# Patient Record
Sex: MALE | Race: WHITE | NOT HISPANIC OR LATINO | Employment: FULL TIME | URBAN - METROPOLITAN AREA
[De-identification: names, ages, dates, MRNs, and addresses within clinical notes are randomized per-mention and may not be internally consistent; named-entity substitution may affect disease eponyms.]

---

## 2023-03-06 DIAGNOSIS — E29.1 HYPOGONADISM MALE: Primary | ICD-10-CM

## 2023-03-06 PROBLEM — M79.673 FOOT PAIN: Status: ACTIVE | Noted: 2023-03-06

## 2023-03-06 PROBLEM — R21 RASH: Status: ACTIVE | Noted: 2023-03-06

## 2023-03-06 PROBLEM — Z86.19 H/O COLD SORES: Status: ACTIVE | Noted: 2023-03-06

## 2023-03-06 PROBLEM — G57.91: Status: ACTIVE | Noted: 2023-03-06

## 2023-03-06 PROBLEM — A64 STD (MALE): Status: ACTIVE | Noted: 2023-03-06

## 2023-03-06 PROBLEM — R79.89 ELEVATED SERUM CREATININE: Status: ACTIVE | Noted: 2023-03-06

## 2023-03-06 PROBLEM — M21.40 PES PLANUS: Status: ACTIVE | Noted: 2023-03-06

## 2023-03-06 PROBLEM — M25.511 SHOULDER PAIN, BILATERAL: Status: ACTIVE | Noted: 2023-03-06

## 2023-03-06 PROBLEM — R93.89 ABNORMAL CHEST X-RAY: Status: ACTIVE | Noted: 2023-03-06

## 2023-03-06 PROBLEM — M76.71 PERONEAL TENDINITIS OF RIGHT LOWER LEG: Status: ACTIVE | Noted: 2023-03-06

## 2023-03-06 PROBLEM — M77.41 METATARSALGIA OF RIGHT FOOT: Status: ACTIVE | Noted: 2023-03-06

## 2023-03-06 PROBLEM — M76.829 POSTERIOR TIBIAL TENDON DYSFUNCTION: Status: ACTIVE | Noted: 2023-03-06

## 2023-03-06 PROBLEM — M25.512 SHOULDER PAIN, BILATERAL: Status: ACTIVE | Noted: 2023-03-06

## 2023-03-06 PROBLEM — M81.0 OSTEOPOROSIS: Status: ACTIVE | Noted: 2023-03-06

## 2023-03-06 PROBLEM — M25.579 ANKLE PAIN: Status: ACTIVE | Noted: 2023-03-06

## 2023-03-06 PROBLEM — K21.9 GERD WITHOUT ESOPHAGITIS: Status: ACTIVE | Noted: 2023-03-06

## 2023-03-06 PROBLEM — J06.9 VIRAL URI: Status: ACTIVE | Noted: 2023-03-06

## 2023-03-06 PROBLEM — M76.61 ACHILLES TENDINITIS OF RIGHT LOWER EXTREMITY: Status: ACTIVE | Noted: 2023-03-06

## 2023-03-06 PROBLEM — S93.409A SPRAIN OF ANKLE: Status: ACTIVE | Noted: 2023-03-06

## 2023-03-06 RX ORDER — TESTOSTERONE CYPIONATE 200 MG/ML
200 INJECTION, SOLUTION INTRAMUSCULAR
COMMUNITY
Start: 2012-12-22 | End: 2023-03-30 | Stop reason: SDUPTHER

## 2023-03-06 RX ORDER — CALCIUM PHOS/VIT D3/MAG OXIDE 600 MG-500
1 TABLET ORAL DAILY
COMMUNITY

## 2023-03-06 RX ORDER — CHOLECALCIFEROL (VITAMIN D3) 25 MCG
1 TABLET ORAL DAILY
COMMUNITY
Start: 2013-08-23

## 2023-03-06 RX ORDER — GLUCOSAMINE/CHONDROITIN/C/MANG 500-400 MG
1 CAPSULE ORAL 3 TIMES DAILY
COMMUNITY
Start: 2018-12-26

## 2023-03-06 RX ORDER — SYRINGE W-NEEDLE,DISPOSAB,3 ML 25GX5/8"
SYRINGE, EMPTY DISPOSABLE MISCELLANEOUS
COMMUNITY
Start: 2014-03-27

## 2023-03-06 RX ORDER — FAMOTIDINE 20 MG/1
1 TABLET, FILM COATED ORAL 2 TIMES DAILY PRN
COMMUNITY
Start: 2016-10-07

## 2023-03-06 RX ORDER — TESTOSTERONE CYPIONATE 200 MG/ML
200 INJECTION, SOLUTION INTRAMUSCULAR
Qty: 6 ML | Refills: 0 | Status: CANCELLED | OUTPATIENT
Start: 2023-03-06 | End: 2023-06-04

## 2023-03-09 NOTE — TELEPHONE ENCOUNTER
Spoke with Pt he is aware to have his labs drawn and I confirmed how he would like to receive his link to sign up for [facility], Email was verified per Pt's request and link was sent via email.

## 2023-03-09 NOTE — TELEPHONE ENCOUNTER
Pt called for a refill of a medication, request sent to [facility], per [facility] Pt is to have his labs checked and to sign up for MyChart. A voicemail has been left for Pt to give our office a callback so I can inform him of what his next steps are. ST

## 2023-03-17 ENCOUNTER — LAB (OUTPATIENT)
Dept: LAB | Facility: LAB | Age: 58
End: 2023-03-17
Payer: COMMERCIAL

## 2023-03-17 DIAGNOSIS — E29.1 HYPOGONADISM MALE: ICD-10-CM

## 2023-03-17 LAB — PROSTATE SPECIFIC AG (NG/ML) IN SER/PLAS: 0.76 NG/ML (ref 0–4)

## 2023-03-17 PROCEDURE — 84153 ASSAY OF PSA TOTAL: CPT

## 2023-03-17 PROCEDURE — 36415 COLL VENOUS BLD VENIPUNCTURE: CPT

## 2023-03-17 PROCEDURE — 84403 ASSAY OF TOTAL TESTOSTERONE: CPT

## 2023-03-18 LAB — TESTOSTERONE (NG/DL) IN SER/PLAS: 506 NG/DL (ref 240–1000)

## 2023-03-30 DIAGNOSIS — E29.1 HYPOGONADISM MALE: Primary | ICD-10-CM

## 2023-03-30 RX ORDER — TESTOSTERONE CYPIONATE 200 MG/ML
200 INJECTION, SOLUTION INTRAMUSCULAR
Qty: 10 ML | Refills: 0 | Status: SHIPPED | OUTPATIENT
Start: 2023-03-30 | End: 2023-09-07 | Stop reason: SDUPTHER

## 2023-05-08 ENCOUNTER — OFFICE VISIT (OUTPATIENT)
Dept: PRIMARY CARE | Facility: CLINIC | Age: 58
End: 2023-05-08
Payer: COMMERCIAL

## 2023-05-08 VITALS — WEIGHT: 145.38 LBS | HEIGHT: 67 IN | BODY MASS INDEX: 22.82 KG/M2

## 2023-05-08 DIAGNOSIS — M77.9 TENDINITIS: Primary | ICD-10-CM

## 2023-05-08 PROCEDURE — 99214 OFFICE O/P EST MOD 30 MIN: CPT | Performed by: INTERNAL MEDICINE

## 2023-05-08 RX ORDER — OMEPRAZOLE 20 MG/1
20 CAPSULE, DELAYED RELEASE ORAL
Qty: 30 CAPSULE | Refills: 1 | Status: SHIPPED | OUTPATIENT
Start: 2023-05-08 | End: 2023-06-01

## 2023-05-08 RX ORDER — CELECOXIB 200 MG/1
200 CAPSULE ORAL 2 TIMES DAILY PRN
Qty: 60 CAPSULE | Refills: 1 | Status: SHIPPED | OUTPATIENT
Start: 2023-05-08 | End: 2023-07-05

## 2023-05-08 RX ORDER — DOXYCYCLINE 100 MG/1
CAPSULE ORAL
COMMUNITY
Start: 2022-12-30 | End: 2024-04-15 | Stop reason: ALTCHOICE

## 2023-05-08 NOTE — PROGRESS NOTES
"Subjective   Patient ID: Hari Lara is a 57 y.o. male who presents for No chief complaint on file..    Hari is in with a flare of pain in the L shoulder and axilla. Aggravated after going back to doing push-ups. He has been using icing. When he used ibuprofen, it aggravates his stomach and causes stomach pain.  No bruising in the area of pain at present, but he did have a bruise in the L upper chest.         Review of Systems   All other systems reviewed and are negative.      Objective   Ht 1.702 m (5' 7\")   Wt 65.9 kg (145 lb 6 oz)   BMI 22.77 kg/m²     Physical Exam  Constitutional:       Appearance: Normal appearance.   Cardiovascular:      Rate and Rhythm: Normal rate and regular rhythm.   Pulmonary:      Effort: Pulmonary effort is normal.      Breath sounds: Normal breath sounds.   Musculoskeletal:      Comments: FROM L shoulder.  Mild tenderness at the biceps tendon.  Mild tenderness of pectoral tendon.  Mild tenderness in the serratus anterior on the L.  Breast normal.     Neurological:      Mental Status: He is alert.         Assessment/Plan   Diagnoses and all orders for this visit:  Tendinitis  We will try to use celecoxib 200mg daily and omeprazole 20mg daily to treat the tendinitis and reduce the likelihood of GI distress.    Reduction of activity until pain-free.     "

## 2023-05-31 DIAGNOSIS — K21.9 GERD WITHOUT ESOPHAGITIS: Primary | ICD-10-CM

## 2023-05-31 DIAGNOSIS — M77.9 TENDINITIS: ICD-10-CM

## 2023-06-01 RX ORDER — OMEPRAZOLE 20 MG/1
20 CAPSULE, DELAYED RELEASE ORAL
Qty: 30 CAPSULE | Refills: 1 | Status: SHIPPED | OUTPATIENT
Start: 2023-06-01 | End: 2023-07-01 | Stop reason: SDUPTHER

## 2023-06-30 DIAGNOSIS — K21.9 GERD WITHOUT ESOPHAGITIS: ICD-10-CM

## 2023-07-01 RX ORDER — OMEPRAZOLE 20 MG/1
20 CAPSULE, DELAYED RELEASE ORAL
Qty: 90 CAPSULE | Refills: 3 | Status: SHIPPED | OUTPATIENT
Start: 2023-07-01 | End: 2024-04-15 | Stop reason: ALTCHOICE

## 2023-07-04 DIAGNOSIS — M77.9 TENDINITIS: ICD-10-CM

## 2023-07-05 DIAGNOSIS — M77.9 TENDINITIS: ICD-10-CM

## 2023-07-05 RX ORDER — CELECOXIB 200 MG/1
CAPSULE ORAL
Qty: 60 CAPSULE | Refills: 1 | Status: SHIPPED | OUTPATIENT
Start: 2023-07-05 | End: 2023-07-06 | Stop reason: SDUPTHER

## 2023-07-06 DIAGNOSIS — M77.9 TENDINITIS: ICD-10-CM

## 2023-07-06 RX ORDER — CELECOXIB 200 MG/1
CAPSULE ORAL
Qty: 60 CAPSULE | Refills: 1 | OUTPATIENT
Start: 2023-07-06

## 2023-07-06 RX ORDER — CELECOXIB 200 MG/1
200 CAPSULE ORAL DAILY PRN
Qty: 60 CAPSULE | Refills: 1 | Status: SHIPPED | OUTPATIENT
Start: 2023-07-06 | End: 2024-05-15 | Stop reason: SDUPTHER

## 2023-09-07 DIAGNOSIS — E29.1 HYPOGONADISM MALE: ICD-10-CM

## 2023-09-07 RX ORDER — TESTOSTERONE CYPIONATE 200 MG/ML
200 INJECTION, SOLUTION INTRAMUSCULAR
Qty: 20 ML | Refills: 0 | Status: SHIPPED | OUTPATIENT
Start: 2023-09-07 | End: 2024-03-28

## 2023-09-29 PROBLEM — G89.29 CHRONIC HEEL PAIN: Status: ACTIVE | Noted: 2017-08-28

## 2023-09-29 PROBLEM — M79.673 CHRONIC HEEL PAIN: Status: ACTIVE | Noted: 2017-08-28

## 2023-09-29 RX ORDER — PSYLLIUM SEED
PACKET (EA) ORAL DAILY
COMMUNITY
Start: 2006-12-13

## 2023-09-29 RX ORDER — ERGOCALCIFEROL 1.25 MG/1
50000 CAPSULE ORAL
COMMUNITY
Start: 2007-03-26 | End: 2024-04-15 | Stop reason: ALTCHOICE

## 2023-09-29 RX ORDER — IBUPROFEN 200 MG
1-2 TABLET ORAL AS NEEDED
COMMUNITY
Start: 2007-03-26 | End: 2024-04-15 | Stop reason: ALTCHOICE

## 2023-09-29 RX ORDER — TRIAMCINOLONE ACETONIDE 1 MG/G
1 CREAM TOPICAL
COMMUNITY
Start: 2015-07-08

## 2023-10-02 ENCOUNTER — APPOINTMENT (OUTPATIENT)
Dept: ORTHOPEDIC SURGERY | Facility: HOSPITAL | Age: 58
End: 2023-10-02
Payer: COMMERCIAL

## 2023-10-10 ENCOUNTER — OFFICE VISIT (OUTPATIENT)
Dept: PRIMARY CARE | Facility: CLINIC | Age: 58
End: 2023-10-10
Payer: COMMERCIAL

## 2023-10-10 VITALS
TEMPERATURE: 97.5 F | DIASTOLIC BLOOD PRESSURE: 73 MMHG | WEIGHT: 146 LBS | BODY MASS INDEX: 22.87 KG/M2 | HEART RATE: 70 BPM | SYSTOLIC BLOOD PRESSURE: 114 MMHG

## 2023-10-10 DIAGNOSIS — R10.32 LEFT LOWER QUADRANT ABDOMINAL PAIN: Primary | ICD-10-CM

## 2023-10-10 DIAGNOSIS — H60.91 OTITIS EXTERNA OF RIGHT EAR, UNSPECIFIED CHRONICITY, UNSPECIFIED TYPE: ICD-10-CM

## 2023-10-10 PROCEDURE — 99214 OFFICE O/P EST MOD 30 MIN: CPT | Performed by: INTERNAL MEDICINE

## 2023-10-10 NOTE — PROGRESS NOTES
"Subjective   Patient ID: Hari Lara is a 58 y.o. male who presents for No chief complaint on file..  On Sunday, after lunch, Hari developed some LLQ pain. It did not really get better and was sore until he moved his bowels. Also, he has had a history of cauliflower ear on the R from HS wrestling. Last month he had a little bloody discharge from the ear. He also noted a swelling in the R preauricular area.       Current Outpatient Medications   Medication Instructions    calcium phos-vit D3-mag oxide (Posture-D, with magnesium,) 600 mg calcium- 500 unit-50 mg tablet 1 tablet, oral, Daily    celecoxib (CELEBREX) 200 mg, oral, Daily PRN    cholecalciferol (Vitamin D-3) 25 MCG (1000 UT) tablet 1 tablet, oral, Daily    doxycycline (Vibramycin) 100 mg capsule TAKE 1 CAPSULE BY MOUTH 2 TIMES PER DAY FOR 10 DAYS    ergocalciferol (VITAMIN D-2) 50,000 Units, oral, Weekly    famotidine (Pepcid) 20 mg tablet 1 tablet, oral, 2 times daily PRN    fish oil concentrate (Omega-3) 120-180 mg capsule 1 capsule, oral, Daily    glucosamine-chondroit-vit C-Mn (Glucosamine-Chondroitin Complx) 500-400 mg capsule 1 capsule, oral, 3 times daily    ibuprofen 200 mg tablet 1-2 tablets, oral, As needed    multivitamin with minerals (Vitamins and Minerals) tablet oral, Daily    omeprazole (PRILOSEC) 20 mg, oral, Daily before breakfast, Do not crush or chew.    psyllium (Metamucil) 3.4 gram packet oral    psyllium (Metamucil, sugar,) powder oral, Daily, 1 tbsp    syringe with needle 3 mL 23 gauge x 1 1/2\" syringe USE AS DIRECTED.    testosterone cypionate (DEPO-TESTOSTERONE) 200 mg, intramuscular, Every 14 days    triamcinolone (Kenalog) 0.1 % cream 1 Application     Review of Systems   All other systems reviewed and are negative.      Objective   Physical Exam  Constitutional:       Appearance: Normal appearance.   HENT:      Right Ear: Tympanic membrane normal.      Left Ear: Tympanic membrane normal.      Ears:      Comments: " Inflamed R external ear with erythema and mild exudate. See media. + enlarged R preauricular LN.  Abdominal:      General: Abdomen is flat. Bowel sounds are normal. There is no distension.      Palpations: Abdomen is soft. There is no mass.      Tenderness: There is abdominal tenderness (minimal).   Neurological:      Mental Status: He is alert.           Assessment/Plan   Diagnoses and all orders for this visit:  Left lower quadrant abdominal pain  Otitis externa of right ear, unspecified chronicity, unspecified type     Treat the ear with bacitracin ointment to the inner ear fold bid x 7 days.  LLQ pain has resolved. Likely due to a specific food he ate that day. No need to investigate further at this time.

## 2024-03-28 DIAGNOSIS — E29.1 HYPOGONADISM MALE: ICD-10-CM

## 2024-03-28 RX ORDER — TESTOSTERONE CYPIONATE 200 MG/ML
INJECTION, SOLUTION INTRAMUSCULAR
Qty: 20 ML | Refills: 0 | Status: SHIPPED | OUTPATIENT
Start: 2024-03-28 | End: 2024-06-01

## 2024-04-15 ENCOUNTER — OFFICE VISIT (OUTPATIENT)
Dept: PRIMARY CARE | Facility: CLINIC | Age: 59
End: 2024-04-15
Payer: COMMERCIAL

## 2024-04-15 VITALS
BODY MASS INDEX: 22.55 KG/M2 | WEIGHT: 144 LBS | SYSTOLIC BLOOD PRESSURE: 115 MMHG | HEART RATE: 74 BPM | DIASTOLIC BLOOD PRESSURE: 72 MMHG | TEMPERATURE: 97.4 F

## 2024-04-15 DIAGNOSIS — Z12.5 PROSTATE CANCER SCREENING: ICD-10-CM

## 2024-04-15 DIAGNOSIS — Z86.19 H/O COLD SORES: ICD-10-CM

## 2024-04-15 DIAGNOSIS — E29.1 HYPOGONADISM MALE: Primary | ICD-10-CM

## 2024-04-15 DIAGNOSIS — M81.8 IDIOPATHIC OSTEOPOROSIS: ICD-10-CM

## 2024-04-15 PROCEDURE — 1036F TOBACCO NON-USER: CPT | Performed by: INTERNAL MEDICINE

## 2024-04-15 PROCEDURE — 99214 OFFICE O/P EST MOD 30 MIN: CPT | Performed by: INTERNAL MEDICINE

## 2024-04-15 RX ORDER — VALACYCLOVIR HYDROCHLORIDE 500 MG/1
500 TABLET, FILM COATED ORAL DAILY
Qty: 90 TABLET | Refills: 3 | Status: SHIPPED | OUTPATIENT
Start: 2024-04-15 | End: 2025-04-15

## 2024-04-15 ASSESSMENT — PAIN SCALES - GENERAL: PAINLEVEL: 0-NO PAIN

## 2024-04-15 NOTE — ASSESSMENT & PLAN NOTE
We should repeat DXA. I will likely recommend Forteo for 2 years. I will provide the drug information handout. Hari will consider this.

## 2024-04-15 NOTE — PROGRESS NOTES
"Subjective   Patient ID: Hari Lara is a 58 y.o. male who presents for Follow-up.  Hari is in for management of hypogonadism, OP. He has noted a minimally, occasionally, painful vein in the RLE. Mid-calf. Anterolateral. Has a small nnodule in R mid-abdomen.  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  He has noted increased frequency of outbreaks from HSV.      Current Outpatient Medications   Medication Instructions    calcium phos-vit D3-mag oxide (Posture-D, with magnesium,) 600 mg calcium- 500 unit-50 mg tablet 1 tablet, oral, Daily    cholecalciferol (Vitamin D-3) 25 MCG (1000 UT) tablet 1 tablet, oral, Daily    famotidine (Pepcid) 20 mg tablet 1 tablet, oral, 2 times daily PRN    fish oil concentrate (Omega-3) 120-180 mg capsule 1 capsule, oral, Daily    glucosamine-chondroit-vit C-Mn (Glucosamine-Chondroitin Complx) 500-400 mg capsule 1 capsule, oral, 3 times daily    multivitamin with minerals (Vitamins and Minerals) tablet oral, Daily    psyllium (Metamucil, sugar,) powder oral, Daily, 1 tbsp    syringe with needle 3 mL 23 gauge x 1 1/2\" syringe USE AS DIRECTED.    testosterone cypionate (Depo-Testosterone) 200 mg/mL injection Inject 1 mL into the shoulder, thigh, or buttocks every 14 days.    triamcinolone (Kenalog) 0.1 % cream 1 Application    valACYclovir (VALTREX) 500 mg, oral, Daily     Review of Systems  All other systems are reviewed and are without complaint.    Objective   /72 (BP Location: Right arm, Patient Position: Sitting, BP Cuff Size: Large adult)   Pulse 74   Temp 36.3 °C (97.4 °F) (Temporal)   Wt 65.3 kg (144 lb)   BMI 22.55 kg/m²   Physical Exam  Moderate sized varicose vein in RLE anterolateral shin.  Tiny subcutaneous nodule in R lower abdomen    Assessment/Plan   Problem List Items Addressed This Visit             ICD-10-CM    H/O cold sores Z86.19     Due to increased frequency of outbreaks, valacyclovir 500mg " daily.         Relevant Medications    valACYclovir (Valtrex) 500 mg tablet    Hypogonadism male - Primary E29.1     Check testosterone, CBC, PSA.         Relevant Orders    CBC and Auto Differential    Comprehensive Metabolic Panel    Testosterone    Idiopathic osteoporosis M81.8     We should repeat DXA. I will likely recommend Forteo for 2 years. I will provide the drug information handout. Hari will consider this.         Relevant Orders    Vitamin D 25-Hydroxy,Total (for eval of Vitamin D levels)    XR DEXA bone density     Other Visit Diagnoses         Codes    Prostate cancer screening     Z12.5    Relevant Orders    Prostate Specific Antigen, Screen

## 2024-04-26 ENCOUNTER — HOSPITAL ENCOUNTER (OUTPATIENT)
Dept: RADIOLOGY | Facility: CLINIC | Age: 59
Discharge: HOME | End: 2024-04-26
Payer: COMMERCIAL

## 2024-04-26 DIAGNOSIS — M81.8 IDIOPATHIC OSTEOPOROSIS: ICD-10-CM

## 2024-04-26 PROCEDURE — 77080 DXA BONE DENSITY AXIAL: CPT

## 2024-04-26 PROCEDURE — 77080 DXA BONE DENSITY AXIAL: CPT | Performed by: RADIOLOGY

## 2024-04-29 NOTE — RESULT ENCOUNTER NOTE
Hari,  Your bone density remains in the severely osteoporotic range.  I think it is advisable for you to receive treatment to increase bone density.  It would be my recommendation for you to begin Forteo.  Please advise me whether you are willing to start this treatment. If so, I will begin the prior authorization process for you.  Do not hesitate to contact me if you have questions or concerns.    Sincerely,  Casey De León M.D.

## 2024-05-15 ENCOUNTER — OFFICE VISIT (OUTPATIENT)
Dept: PRIMARY CARE | Facility: CLINIC | Age: 59
End: 2024-05-15
Payer: COMMERCIAL

## 2024-05-15 VITALS — BODY MASS INDEX: 23.18 KG/M2 | TEMPERATURE: 98.3 F | WEIGHT: 148 LBS

## 2024-05-15 DIAGNOSIS — M77.9 TENDINITIS: Primary | ICD-10-CM

## 2024-05-15 DIAGNOSIS — K21.9 GERD WITHOUT ESOPHAGITIS: ICD-10-CM

## 2024-05-15 PROCEDURE — 99214 OFFICE O/P EST MOD 30 MIN: CPT | Performed by: INTERNAL MEDICINE

## 2024-05-15 RX ORDER — CELECOXIB 200 MG/1
200 CAPSULE ORAL DAILY PRN
Qty: 60 CAPSULE | Refills: 1 | Status: SHIPPED | OUTPATIENT
Start: 2024-05-15 | End: 2024-09-12

## 2024-05-15 RX ORDER — OMEPRAZOLE 20 MG/1
20 CAPSULE, DELAYED RELEASE ORAL
Qty: 90 CAPSULE | Refills: 3 | Status: SHIPPED | OUTPATIENT
Start: 2024-05-15 | End: 2025-05-15

## 2024-05-15 ASSESSMENT — PAIN SCALES - GENERAL: PAINLEVEL: 0-NO PAIN

## 2024-05-15 NOTE — PROGRESS NOTES
"Subjective   Patient ID: Hari Lara is a 58 y.o. male who presents for Follow-up.  Hari has had pain ~ 3 weeks in his pectoral region and biceps region. He carried a wooden pulpit weighing about 15 pounds, in from the parking lot. He developed pain a short time after that.  As he started to improve from that, he practiced yoga, including the downward dog position, which seemed to flare that up.  He also notes pain around his R > L knee. He thinks that stand-ups were the etiology of the thigh muscle pain. He does lower body 2/week, upper body 2/week, yoga 2/week. These are on a rotating basis. Usual workout is 4/week of a combination of above. He used Celebrex, once daily and occasionally twice daily. He occasionally uses free weights with no more than 10 or 15 pounds in each hand. There is no overload training.    Pain is not affecting sleep. No rashes around sore muscles. He used some old Celebrex which helped temporarily.       Current Outpatient Medications   Medication Instructions    calcium phos-vit D3-mag oxide (Posture-D, with magnesium,) 600 mg calcium- 500 unit-50 mg tablet 1 tablet, oral, Daily    cholecalciferol (Vitamin D-3) 25 MCG (1000 UT) tablet 1 tablet, oral, Daily    famotidine (Pepcid) 20 mg tablet 1 tablet, oral, 2 times daily PRN    fish oil concentrate (Omega-3) 120-180 mg capsule 1 capsule, oral, Daily    glucosamine-chondroit-vit C-Mn (Glucosamine-Chondroitin Complx) 500-400 mg capsule 1 capsule, oral, 3 times daily    multivitamin with minerals (Vitamins and Minerals) tablet oral, Daily    psyllium (Metamucil, sugar,) powder oral, Daily, 1 tbsp    syringe with needle 3 mL 23 gauge x 1 1/2\" syringe USE AS DIRECTED.    testosterone cypionate (Depo-Testosterone) 200 mg/mL injection Inject 1 mL into the shoulder, thigh, or buttocks every 14 days.    triamcinolone (Kenalog) 0.1 % cream 1 Application    valACYclovir (VALTREX) 500 mg, oral, Daily     Review of Systems  All other systems " are reviewed and are without complaint.    Objective   Temp 36.8 °C (98.3 °F)   Wt 67.1 kg (148 lb)   BMI 23.18 kg/m²   Physical Exam  Tender in Both proximal biceps tendons, both pectoral tendons as they attach laterally. The RTC B are not tender. FROM in shoulders. No joint effusions. Tender in B medial thigh adductor tendons distally    Assessment/Plan   Diagnoses and all orders for this visit:  Tendinitis  -     celecoxib (CeleBREX) 200 mg capsule; Take 1 capsule (200 mg) by mouth once daily as needed for mild pain (1 - 3) or moderate pain (4 - 6).  -     omeprazole (PriLOSEC) 20 mg DR capsule; Take 1 capsule (20 mg) by mouth once daily in the morning. Take before meals. Do not crush or chew.  GERD without esophagitis  -     omeprazole (PriLOSEC) 20 mg DR capsule; Take 1 capsule (20 mg) by mouth once daily in the morning. Take before meals. Do not crush or chew.

## 2024-05-31 ENCOUNTER — LAB (OUTPATIENT)
Dept: LAB | Facility: LAB | Age: 59
End: 2024-05-31
Payer: COMMERCIAL

## 2024-05-31 DIAGNOSIS — M81.8 IDIOPATHIC OSTEOPOROSIS: ICD-10-CM

## 2024-05-31 DIAGNOSIS — E29.1 HYPOGONADISM MALE: ICD-10-CM

## 2024-05-31 DIAGNOSIS — Z12.5 PROSTATE CANCER SCREENING: ICD-10-CM

## 2024-05-31 LAB
25(OH)D3 SERPL-MCNC: 42 NG/ML (ref 30–100)
ALBUMIN SERPL BCP-MCNC: 4.4 G/DL (ref 3.4–5)
ALP SERPL-CCNC: 49 U/L (ref 33–120)
ALT SERPL W P-5'-P-CCNC: 23 U/L (ref 10–52)
ANION GAP SERPL CALC-SCNC: 9 MMOL/L (ref 10–20)
AST SERPL W P-5'-P-CCNC: 21 U/L (ref 9–39)
BASOPHILS # BLD AUTO: 0.02 X10*3/UL (ref 0–0.1)
BASOPHILS NFR BLD AUTO: 0.7 %
BILIRUB SERPL-MCNC: 1 MG/DL (ref 0–1.2)
BUN SERPL-MCNC: 10 MG/DL (ref 6–23)
CALCIUM SERPL-MCNC: 9.9 MG/DL (ref 8.6–10.3)
CHLORIDE SERPL-SCNC: 103 MMOL/L (ref 98–107)
CO2 SERPL-SCNC: 30 MMOL/L (ref 21–32)
CREAT SERPL-MCNC: 0.95 MG/DL (ref 0.5–1.3)
EGFRCR SERPLBLD CKD-EPI 2021: >90 ML/MIN/1.73M*2
EOSINOPHIL # BLD AUTO: 0.02 X10*3/UL (ref 0–0.7)
EOSINOPHIL NFR BLD AUTO: 0.7 %
ERYTHROCYTE [DISTWIDTH] IN BLOOD BY AUTOMATED COUNT: 12.9 % (ref 11.5–14.5)
GLUCOSE SERPL-MCNC: 81 MG/DL (ref 74–99)
HCT VFR BLD AUTO: 48.8 % (ref 41–52)
HGB BLD-MCNC: 16.5 G/DL (ref 13.5–17.5)
IMM GRANULOCYTES # BLD AUTO: 0.01 X10*3/UL (ref 0–0.7)
IMM GRANULOCYTES NFR BLD AUTO: 0.3 % (ref 0–0.9)
LYMPHOCYTES # BLD AUTO: 1.06 X10*3/UL (ref 1.2–4.8)
LYMPHOCYTES NFR BLD AUTO: 36.8 %
MCH RBC QN AUTO: 32.2 PG (ref 26–34)
MCHC RBC AUTO-ENTMCNC: 33.8 G/DL (ref 32–36)
MCV RBC AUTO: 95 FL (ref 80–100)
MONOCYTES # BLD AUTO: 0.28 X10*3/UL (ref 0.1–1)
MONOCYTES NFR BLD AUTO: 9.7 %
NEUTROPHILS # BLD AUTO: 1.49 X10*3/UL (ref 1.2–7.7)
NEUTROPHILS NFR BLD AUTO: 51.8 %
NRBC BLD-RTO: 0 /100 WBCS (ref 0–0)
PLATELET # BLD AUTO: 200 X10*3/UL (ref 150–450)
POTASSIUM SERPL-SCNC: 4.6 MMOL/L (ref 3.5–5.3)
PROT SERPL-MCNC: 7.4 G/DL (ref 6.4–8.2)
PSA SERPL-MCNC: 1.04 NG/ML
RBC # BLD AUTO: 5.13 X10*6/UL (ref 4.5–5.9)
SODIUM SERPL-SCNC: 137 MMOL/L (ref 136–145)
TESTOST SERPL-MCNC: 1555 NG/DL (ref 240–1000)
WBC # BLD AUTO: 2.9 X10*3/UL (ref 4.4–11.3)

## 2024-05-31 PROCEDURE — 85025 COMPLETE CBC W/AUTO DIFF WBC: CPT

## 2024-05-31 PROCEDURE — 80053 COMPREHEN METABOLIC PANEL: CPT

## 2024-05-31 PROCEDURE — 84403 ASSAY OF TOTAL TESTOSTERONE: CPT

## 2024-05-31 PROCEDURE — 36415 COLL VENOUS BLD VENIPUNCTURE: CPT

## 2024-05-31 PROCEDURE — 84153 ASSAY OF PSA TOTAL: CPT

## 2024-05-31 PROCEDURE — 82306 VITAMIN D 25 HYDROXY: CPT

## 2024-06-01 DIAGNOSIS — E29.1 HYPOGONADISM MALE: ICD-10-CM

## 2024-06-01 RX ORDER — TESTOSTERONE CYPIONATE 200 MG/ML
INJECTION, SOLUTION INTRAMUSCULAR
Start: 2024-06-01

## 2024-06-01 NOTE — RESULT ENCOUNTER NOTE
Hari,  I know we will discuss the level of testosterone this week, but your dose should be reduced substantially. Let's decrease to 0.7ml (=140mg) every week please.  The remainder of your testing is satisfactory.  Do not hesitate to contact me if you have questions or concerns.    Sincerely,  Casey De León M.D.

## 2024-06-05 ENCOUNTER — OFFICE VISIT (OUTPATIENT)
Dept: PRIMARY CARE | Facility: CLINIC | Age: 59
End: 2024-06-05
Payer: COMMERCIAL

## 2024-06-05 VITALS
SYSTOLIC BLOOD PRESSURE: 119 MMHG | WEIGHT: 147.4 LBS | DIASTOLIC BLOOD PRESSURE: 79 MMHG | HEART RATE: 81 BPM | TEMPERATURE: 98.5 F | BODY MASS INDEX: 23.13 KG/M2 | HEIGHT: 67 IN

## 2024-06-05 DIAGNOSIS — E29.1 HYPOGONADISM MALE: Primary | ICD-10-CM

## 2024-06-05 PROCEDURE — 99215 OFFICE O/P EST HI 40 MIN: CPT | Performed by: INTERNAL MEDICINE

## 2024-06-05 PROCEDURE — 1036F TOBACCO NON-USER: CPT | Performed by: INTERNAL MEDICINE

## 2024-06-05 ASSESSMENT — PAIN SCALES - GENERAL: PAINLEVEL: 2

## 2024-06-05 NOTE — PROGRESS NOTES
Hari tells me that his testosterone level was very surprising. He does use an MVI with biotin at a fairly high dose (500% of RDI). When I look up common doses of supplemental biotin, it is often 1600% or more. We can recheck at current dose which IS between 0.7 and 0.75ml of 200mg/ml testosterone cypionate.    The main purpose of today's visit was to discuss his severe osteoporosis and treatment options.    During this 60-minute visit, we reviewed bone densities from 2011, 2015, 2019 and 2024.  It is noted that the most recent bone density results came on a [employer]yle[employer] platform instead of the GE lunar Prodigy platform that his previous 3 tests were run upon.  Nevertheless, although the bone mineral density measurements are not easily translated both, T-scores of less than negative for indicate the presence of severe osteoporosis still.    We discussed the rationale and benefits of treatment.  We also discussed the risks.  Consideration of no treatment was discussed and is clearly not preferable but I would excepted as his choice.  He is intolerant of oral bisphosphonates.    We discussed the use of Forteo.  I described that it is a daily subcutaneous injection which would be expected to last for 2 years.  Because this is an anabolic agent, I have recommended is my preferred therapy.    We discussed annual infusions of zoledronic acid, and antiresorptive therapy.  This will require less in terms of compliance.  Data on reduction of severe vertebral fractures were shared with the patient.  He has no major planned upcoming dental work.  We discussed the small chance of unusual complications from treatment.    We also considered the use of Evenity.  This is a drug which is not FDA approved for use in men.  I do not recommend treatment with this drug.    Written information was given about his prior bone densities.  Patient information for the use of zoledronic acid was provided.    He will consider the various forms of  treatment for nontreatment and get back to me with his preference.    >50% of today's visit was counseling and coordination of care.

## 2024-10-04 ENCOUNTER — LAB (OUTPATIENT)
Dept: LAB | Facility: LAB | Age: 59
End: 2024-10-04
Payer: COMMERCIAL

## 2024-10-04 DIAGNOSIS — E29.1 HYPOGONADISM MALE: ICD-10-CM

## 2024-10-04 LAB — TESTOST SERPL-MCNC: 1452 NG/DL (ref 240–1000)

## 2024-10-04 PROCEDURE — 36415 COLL VENOUS BLD VENIPUNCTURE: CPT

## 2024-10-04 PROCEDURE — 84403 ASSAY OF TOTAL TESTOSTERONE: CPT

## 2024-10-05 DIAGNOSIS — E29.1 HYPOGONADISM MALE: ICD-10-CM

## 2024-10-07 RX ORDER — TESTOSTERONE CYPIONATE 200 MG/ML
INJECTION, SOLUTION INTRAMUSCULAR
Qty: 10 ML | Refills: 0 | Status: SHIPPED | OUTPATIENT
Start: 2024-10-07

## 2025-01-17 DIAGNOSIS — E29.1 HYPOGONADISM MALE: Primary | ICD-10-CM

## 2025-01-23 RX ORDER — TESTOSTERONE CYPIONATE 200 MG/ML
INJECTION, SOLUTION INTRAMUSCULAR
Qty: 10 ML | Refills: 0 | Status: SHIPPED | OUTPATIENT
Start: 2025-01-23

## 2025-01-23 NOTE — TELEPHONE ENCOUNTER
Message was left with patient to schedule an appointment and that a refill can not be done without this appointment.

## 2025-04-07 ENCOUNTER — APPOINTMENT (OUTPATIENT)
Dept: ORTHOPEDIC SURGERY | Facility: HOSPITAL | Age: 60
End: 2025-04-07
Payer: COMMERCIAL

## 2025-04-07 ENCOUNTER — APPOINTMENT (OUTPATIENT)
Dept: RADIOLOGY | Facility: HOSPITAL | Age: 60
End: 2025-04-07
Payer: COMMERCIAL

## 2025-04-07 DIAGNOSIS — M79.671 RIGHT FOOT PAIN: ICD-10-CM
